# Patient Record
Sex: MALE | Race: ASIAN | NOT HISPANIC OR LATINO
[De-identification: names, ages, dates, MRNs, and addresses within clinical notes are randomized per-mention and may not be internally consistent; named-entity substitution may affect disease eponyms.]

---
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
## 2022-04-26 ENCOUNTER — OFFICE VISIT (OUTPATIENT)
Dept: FAMILY MEDICINE | Facility: CLINIC | Age: 22
End: 2022-04-26
Payer: OTHER MISCELLANEOUS

## 2022-04-26 VITALS
SYSTOLIC BLOOD PRESSURE: 138 MMHG | HEIGHT: 67 IN | BODY MASS INDEX: 22.76 KG/M2 | RESPIRATION RATE: 20 BRPM | WEIGHT: 145 LBS | OXYGEN SATURATION: 95 % | HEART RATE: 61 BPM | DIASTOLIC BLOOD PRESSURE: 73 MMHG | TEMPERATURE: 98.3 F

## 2022-04-26 DIAGNOSIS — S63.501A WRIST SPRAIN, RIGHT, INITIAL ENCOUNTER: Primary | ICD-10-CM

## 2022-04-26 PROCEDURE — 99203 OFFICE O/P NEW LOW 30 MIN: CPT | Mod: GC | Performed by: STUDENT IN AN ORGANIZED HEALTH CARE EDUCATION/TRAINING PROGRAM

## 2022-04-26 NOTE — PROGRESS NOTES
[facility] - Office Visit    ASSESSMENT & PLAN     No Mio is an otherwise healthy 21-year-old male here today for acute complaint of pain in right wrist.    Wrist sprain, right, initial encounter  Pain X 2 days.  Right-handed patient with new job requiring lifting weights and multiple fine movements including extension and external rotation of wrist.  Pain and tenderness on passive extension of wrist worse at end of workday.  Consistent with wrist sprain secondary to new physical activity.  No evidence of fracture or other acute injury on exam.  Imaging not indicated at this time.  -     WRIST SPLINT provided--patient to wear nightly and as tolerated at work  -  Letter provided for missing work today  - Advised to take time off work if possible, or wear splint while working until develops strength in wrist      Follow-up: Return in 1 week if symptoms do not improve    Patient dicussed with attending physician, Dr.Jamie Grullon, who agrees with the plan.   -----  Kari Macias MD  PGY-3  Family Medicine Resident       SUBJECTIVE       No Mio is a 21 year old  male who presents to clinic today for the following:  Chief Complaint   Patient presents with     Other     Right wrist painful from yesterday     Patient reports pain in his right wrist, worse with extension present for the past couple of days, worsened yesterday.  Patient reports that he recently started a new job at a [employer] place which requires frequent external rotation of the wrist, lifting weights without hands and wrist, multiple physical activities with a hands that he is not used to.  Reports that at the end of the day he has soreness in arms and feet worse at the end of day, improved by morning.    Patient is right-handed and had not made the connection with his job as recent toleration.  He is amenable to taking time off work if needed.  He has not tried any treatments for this.    ROS: 10 point ROS neg other than the symptoms noted above  "in the HPI.        OBJECTIVE   /73   Pulse 61   Temp 98.3  F (36.8  C) (Oral)   Resp 20   Ht 1.704 m (5' 7.1\")   Wt 65.8 kg (145 lb)   SpO2 95%   BMI 22.64 kg/m       Physical Exam  Constitutional:       Appearance: Normal appearance.   Cardiovascular:      Rate and Rhythm: Normal rate and regular rhythm.      Pulses: Normal pulses.      Heart sounds: Normal heart sounds.   Pulmonary:      Effort: Pulmonary effort is normal.      Breath sounds: Normal breath sounds.   Musculoskeletal:      Right wrist: Tenderness (pain on passive extension) present. No swelling, deformity, effusion, bony tenderness, snuff box tenderness or crepitus. Normal range of motion. Normal pulse.   Neurological:      Mental Status: He is alert.       "

## 2022-04-26 NOTE — LETTER
April 26, 2022      RE: No Mio  [address] [place] [address]       To Whom it May Concern,    No Mio  was seen in clinic today 4/26/2022.  Please excuse him from work from today.      Feel free to call us with questions or concerns.        Sincerely,  MD Kari Johns MD

## 2022-04-26 NOTE — PROGRESS NOTES
Preceptor Attestation:    I discussed the patient with the resident and evaluated the patient in person. I have verified the content of the note, which accurately reflects my assessment of the patient and the plan of care.   Supervising Physician:  Mike Green MD.

## 2024-11-04 ENCOUNTER — OFFICE VISIT (OUTPATIENT)
Dept: URGENT CARE | Facility: URGENT CARE | Age: 24
End: 2024-11-04

## 2024-11-04 VITALS
TEMPERATURE: 98.2 F | BODY MASS INDEX: 22.02 KG/M2 | SYSTOLIC BLOOD PRESSURE: 113 MMHG | RESPIRATION RATE: 14 BRPM | OXYGEN SATURATION: 98 % | WEIGHT: 141 LBS | HEART RATE: 79 BPM | DIASTOLIC BLOOD PRESSURE: 66 MMHG

## 2024-11-04 DIAGNOSIS — K21.9 GASTROESOPHAGEAL REFLUX DISEASE WITHOUT ESOPHAGITIS: Primary | ICD-10-CM

## 2024-11-04 PROCEDURE — 99213 OFFICE O/P EST LOW 20 MIN: CPT | Performed by: PHYSICIAN ASSISTANT

## 2024-11-04 RX ORDER — FAMOTIDINE 20 MG/1
20 TABLET, FILM COATED ORAL 2 TIMES DAILY
Qty: 14 TABLET | Refills: 0 | Status: SHIPPED | OUTPATIENT
Start: 2024-11-04 | End: 2024-11-11

## 2024-11-04 RX ORDER — SODIUM CITRATE 230 MG
TABLET,CHEWABLE ORAL
COMMUNITY

## 2024-11-04 NOTE — LETTER
November 4, 2024      No Mio  [address] [place] [address]        To Whom It May Concern:    No Mio  was seen  in the clinic today. Please excuse them from work/school until symptoms improve.        Sincerely,        Geovanny Huggins PA-C

## 2024-11-04 NOTE — PROGRESS NOTES
Chief Complaint   Patient presents with    Abdominal Pain     2 nights       ASSESSMENT/PLAN:  Miryam was seen today for abdominal pain.    Diagnoses and all orders for this visit:    Gastroesophageal reflux disease without esophagitis  -     famotidine (PEPCID) 20 MG tablet; Take 1 tablet (20 mg) by mouth 2 times daily for 7 days.  -     Primary Care Referral; Future    No red flag symptoms.  Will do a trial of antacids for a week or so.  Lifestyle modifications.  Follow-up with PCP if symptoms do not improve.    Geovanny Huggins PA-C      SUBJECTIVE:  Miryam is a 23 year old male who presents to urgent care with 2 days of abdominal pain.  He describes it as a burning sensation that happened sometime around 6 or 7 PM and lasts until he wakes up in the morning.  He vomited once the first night but not the second.  No constipation, nausea or vomiting since.  No diarrhea.  No recent travel or antibiotics.  No black or blood in the stool or vomit.  Able to eat and drink okay.  Feels otherwise well.  Born in [facility].  No weight loss    ROS: Pertinent ROS neg other than the symptoms noted above in the HPI.     OBJECTIVE:  /66   Pulse 79   Temp 98.2  F (36.8  C)   Resp 14   Wt 64 kg (141 lb)   SpO2 98%   BMI 22.02 kg/m     GENERAL: alert and no distress  EYES: Eyes grossly normal to inspection, PERRL and conjunctivae and sclerae normal  HENT: nose and mouth without ulcers or lesions  RESP: lungs clear to auscultation - no rales, rhonchi or wheezes  CV: regular rate and rhythm, normal S1 S2, no S3 or S4, no murmur, click or rub, no peripheral edema   ABDOMEN: soft, nontender, no hepatosplenomegaly, no masses and bowel sounds normal  SKIN: no suspicious lesions or rashes  NEURO: Normal strength and tone, mentation intact and speech normal    DIAGNOSTICS    No results found for any visits on 11/04/24.     Current Outpatient Medications   Medication Sig Dispense Refill    acetaminophen (TYLENOL) 160 MG/5ML oral liquid  Take 15 mLs by mouth every 4 hours as needed for fever. 120 mL 2    Dextrose-Fructose-Sod Citrate (NAUZENE) [phone] MG CHEW Take by mouth.      calcium carbonate (TUMS) 500 MG chewable tablet Chew two tablets by mouth as needed no more than six tablets per day (Patient not taking: Reported on 11/4/2024) 30 chew tab 0     No current facility-administered medications for this visit.      There is no problem list on file for this patient.     No past medical history on file.  No past surgical history on file.  No family history on file.  Social History     Tobacco Use    Smoking status: Never    Smokeless tobacco: Never   Substance Use Topics    Alcohol use: Not on file              The plan of care was discussed with the patient. They understand and agree with the course of treatment prescribed. A printed summary was given including instructions and medications.  The use of Dragon/Posiq dictation services may have been used to construct the content in this note; any grammatical or spelling errors are non-intentional. Please contact the author of this note directly if you are in need of any clarification.

## 2025-01-15 ENCOUNTER — OFFICE VISIT (OUTPATIENT)
Dept: FAMILY MEDICINE | Facility: CLINIC | Age: 25
End: 2025-01-15
Payer: COMMERCIAL

## 2025-01-15 VITALS
BODY MASS INDEX: 21.97 KG/M2 | SYSTOLIC BLOOD PRESSURE: 118 MMHG | HEART RATE: 99 BPM | HEIGHT: 67 IN | DIASTOLIC BLOOD PRESSURE: 60 MMHG | RESPIRATION RATE: 16 BRPM | WEIGHT: 140 LBS | OXYGEN SATURATION: 100 % | TEMPERATURE: 97.2 F

## 2025-01-15 DIAGNOSIS — Z13.220 LIPID SCREENING: ICD-10-CM

## 2025-01-15 DIAGNOSIS — Z23 NEED FOR VACCINATION: ICD-10-CM

## 2025-01-15 DIAGNOSIS — Z11.4 SCREENING FOR HIV (HUMAN IMMUNODEFICIENCY VIRUS): ICD-10-CM

## 2025-01-15 DIAGNOSIS — Z13.1 SCREENING FOR DIABETES MELLITUS: ICD-10-CM

## 2025-01-15 DIAGNOSIS — Z11.59 NEED FOR HEPATITIS C SCREENING TEST: ICD-10-CM

## 2025-01-15 DIAGNOSIS — Z28.21 COVID-19 VACCINE DOSE DECLINED: ICD-10-CM

## 2025-01-15 DIAGNOSIS — Z00.00 ROUTINE GENERAL MEDICAL EXAMINATION AT A HEALTH CARE FACILITY: ICD-10-CM

## 2025-01-15 DIAGNOSIS — L40.9 PSORIASIS: ICD-10-CM

## 2025-01-15 DIAGNOSIS — Z11.59 NEED FOR HEPATITIS B SCREENING TEST: ICD-10-CM

## 2025-01-15 LAB
EST. AVERAGE GLUCOSE BLD GHB EST-MCNC: 103 MG/DL
HBA1C MFR BLD: 5.2 % (ref 0–5.6)

## 2025-01-15 PROCEDURE — 86803 HEPATITIS C AB TEST: CPT | Performed by: FAMILY MEDICINE

## 2025-01-15 PROCEDURE — 87340 HEPATITIS B SURFACE AG IA: CPT | Performed by: FAMILY MEDICINE

## 2025-01-15 PROCEDURE — 86706 HEP B SURFACE ANTIBODY: CPT | Performed by: FAMILY MEDICINE

## 2025-01-15 PROCEDURE — 87389 HIV-1 AG W/HIV-1&-2 AB AG IA: CPT | Performed by: FAMILY MEDICINE

## 2025-01-15 PROCEDURE — 99213 OFFICE O/P EST LOW 20 MIN: CPT | Mod: 25 | Performed by: FAMILY MEDICINE

## 2025-01-15 PROCEDURE — 90715 TDAP VACCINE 7 YRS/> IM: CPT | Performed by: FAMILY MEDICINE

## 2025-01-15 PROCEDURE — 86704 HEP B CORE ANTIBODY TOTAL: CPT | Performed by: FAMILY MEDICINE

## 2025-01-15 PROCEDURE — 90471 IMMUNIZATION ADMIN: CPT | Performed by: FAMILY MEDICINE

## 2025-01-15 PROCEDURE — 36415 COLL VENOUS BLD VENIPUNCTURE: CPT | Performed by: FAMILY MEDICINE

## 2025-01-15 PROCEDURE — 83036 HEMOGLOBIN GLYCOSYLATED A1C: CPT | Performed by: FAMILY MEDICINE

## 2025-01-15 PROCEDURE — 80061 LIPID PANEL: CPT | Performed by: FAMILY MEDICINE

## 2025-01-15 PROCEDURE — 99385 PREV VISIT NEW AGE 18-39: CPT | Mod: 25 | Performed by: FAMILY MEDICINE

## 2025-01-15 RX ORDER — TRIAMCINOLONE ACETONIDE 1 MG/G
CREAM TOPICAL 2 TIMES DAILY
Qty: 30 G | Refills: 0 | Status: SHIPPED | OUTPATIENT
Start: 2025-01-15 | End: 2025-01-29

## 2025-01-15 SDOH — HEALTH STABILITY: PHYSICAL HEALTH: ON AVERAGE, HOW MANY MINUTES DO YOU ENGAGE IN EXERCISE AT THIS LEVEL?: 40 MIN

## 2025-01-15 SDOH — HEALTH STABILITY: PHYSICAL HEALTH: ON AVERAGE, HOW MANY DAYS PER WEEK DO YOU ENGAGE IN MODERATE TO STRENUOUS EXERCISE (LIKE A BRISK WALK)?: 3 DAYS

## 2025-01-15 ASSESSMENT — SOCIAL DETERMINANTS OF HEALTH (SDOH): HOW OFTEN DO YOU GET TOGETHER WITH FRIENDS OR RELATIVES?: ONCE A WEEK

## 2025-01-15 ASSESSMENT — PAIN SCALES - GENERAL: PAINLEVEL_OUTOF10: NO PAIN (0)

## 2025-01-15 NOTE — LETTER
February 12, 2025      No Mio  [address]  [place] [address]        Dear ,    We are writing to inform you of your test results.    Test results indicate you may require additional follow up, see comment below.      Your LDL (bad cholesterol) was elevated.     You can improve your cholesterol by controlling the amount and type of fat you eat and by increasing your daily activity level.     Here are some ways to improve your nutrition (adapted from the American Academy of Family Practice handout):   Eat less fat (especially butter, Crisco and other saturated fats)   Buy lean cuts of meat; reduce your portions of red meat or substitute poultry or fish   Use skim milk and low-fat dairy products   Eat no more than 4 egg yolks per week   Avoid fried or fast foods that are high in fat   Eat more fruits and vegetables     You are negative for hepatitis B, but you are not immune.  You are at risk for sameer this in the future if you are not vaccinated.  I recommend getting a 3-shot series over about six months to protect you.  Please call the clinic anytime to schedule a visit for these shots.      Your remaining labs were normal.     Please call or message me if you have questions or concerns.                   Resulted Orders   HIV Antigen Antibody Combo   Result Value Ref Range    HIV Antigen Antibody Combo Nonreactive Nonreactive      Comment:      Negative HIV-1 p24 antigen and HIV-1/2 antibody screening test results usually indicate the absence of HIV-1 and HIV-2 infection. However, such negative results do not rule-out acute HIV infection.  If acute HIV-1 or HIV-2 infection is suspected, detection of HIV-1 or HIV-2 RNA  is recommended. This result is obtained using the Roche Elecsys HIV Duo method on the sarah e801 immunoassay analyzer.   Hepatitis C Screen Reflex to HCV RNA Quant and Genotype   Result Value Ref Range    Hepatitis C Antibody Nonreactive Nonreactive      Comment:      A  nonreactive screening test result does not exclude the possibility of exposure to or infection with HCV. Nonreactive screening test results in individuals with prior exposure to HCV may be due to antibody levels below the limit of detection of this assay or lack of reactivity to the HCV antigens used in this assay. Patients with recent HCV infections (<3 months from time of exposure) may have false-negative HCV antibody results due to the time needed for seroconversion (average of 8 to 9 weeks).   Lipid panel reflex to direct LDL Non-fasting   Result Value Ref Range    Cholesterol 189 <200 mg/dL    Triglycerides 133 <150 mg/dL    Direct Measure HDL 57 >=40 mg/dL    LDL Cholesterol Calculated 105 (H) <100 mg/dL    Non HDL Cholesterol 132 (H) <130 mg/dL    Patient Fasting > 8hrs? No     Narrative    Cholesterol  Desirable: < 200 mg/dL  Borderline High: 200 - 239 mg/dL  High: >= 240 mg/dL    Triglycerides  Normal: < 150 mg/dL  Borderline High: 150 - 199 mg/dL  High: 200-499 mg/dL  Very High: >= 500 mg/dL    Direct Measure HDL  Female: >= 50 mg/dL   Male: >= 40 mg/dL    LDL Cholesterol  Desirable: < 100 mg/dL  Above Desirable: 100 - 129 mg/dL   Borderline High: 130 - 159 mg/dL   High:  160 - 189 mg/dL   Very High: >= 190 mg/dL    Non HDL Cholesterol  Desirable: < 130 mg/dL  Above Desirable: 130 - 159 mg/dL  Borderline High: 160 - 189 mg/dL  High: 190 - 219 mg/dL  Very High: >= 220 mg/dL   Hemoglobin A1c   Result Value Ref Range    Estimated Average Glucose 103 <117 mg/dL    Hemoglobin A1C 5.2 0.0 - 5.6 %      Comment:      Normal <5.7%   Prediabetes 5.7-6.4%    Diabetes 6.5% or higher     Note: Adopted from ADA consensus guidelines.   Hepatitis B core antibody   Result Value Ref Range    Hepatitis B Core Antibody Total Nonreactive Nonreactive      Comment:      Nonreactive hepatitis B core antibody test results indicate the absence of exposure to hepatitis B virus and no evidence of recent, past/resolved, or chronic  hepatitis B.    Hepatitis B Surface Antibody   Result Value Ref Range    Hepatitis B Surface Antibody Nonreactive       Comment:      Nonreactive results, defined as anti-HBs levels of less than 8.5 mIU/mL, indicate a lack of recovery from acute or chronic hepatitis B or inadequate immune response to HBV vaccination.    Hepatitis B Surface Antibody Instrument Value <3.50 <8.5 m[IU]/mL   Hepatitis B surface antigen   Result Value Ref Range    Hepatitis B Surface Antigen Nonreactive Nonreactive       If you have any questions or concerns, please call the clinic at the number listed above.       Sincerely,      Nacho De Los Santos MD/estuardo    Electronically signed

## 2025-01-15 NOTE — NURSING NOTE
Prior to immunization administration, verified patients identity using patient s name and date of birth. Please see Immunization Activity for additional information.     Screening Questionnaire for Adult Immunization    Are you sick today?   No   Do you have allergies to medications, food, a vaccine component or latex?   No   Have you ever had a serious reaction after receiving a vaccination?   No   Do you have a long-term health problem with heart, lung, kidney, or metabolic disease (e.g., diabetes), asthma, a blood disorder, no spleen, complement component deficiency, a cochlear implant, or a spinal fluid leak?  Are you on long-term aspirin therapy?   No   Do you have cancer, leukemia, HIV/AIDS, or any other immune system problem?   No   Do you have a parent, brother, or sister with an immune system problem?   No   In the past 3 months, have you taken medications that affect  your immune system, such as prednisone, other steroids, or anticancer drugs; drugs for the treatment of rheumatoid arthritis, Crohn s disease, or psoriasis; or have you had radiation treatments?   No   Have you had a seizure, or a brain or other nervous system problem?   No   During the past year, have you received a transfusion of blood or blood    products, or been given immune (gamma) globulin or antiviral drug?   No   For women: Are you pregnant or is there a chance you could become       pregnant during the next month?   No   Have you received any vaccinations in the past 4 weeks?   No     Immunization questionnaire answers were all negative.      Patient instructed to remain in clinic for 15 minutes afterwards, and to report any adverse reactions.     Screening performed by Sharron Hough MA on 1/15/2025 at 3:30 PM.

## 2025-01-15 NOTE — PATIENT INSTRUCTIONS
Over the counter skin products -   Cervae healing ointment     You can use steroid cream twice daily, small amount to affected area for 2 weeks. Follow up with dermatologist.     Patient Education   Preventive Care Advice   This is general advice given by our system to help you stay healthy. However, your care team may have specific advice just for you. Please talk to your care team about your preventive care needs.  Nutrition  Eat 5 or more servings of fruits and vegetables each day.  Try wheat bread, brown rice and whole grain pasta (instead of white bread, rice, and pasta).  Get enough calcium and vitamin D. Check the label on foods and aim for 100% of the RDA (recommended daily allowance).  Lifestyle  Exercise at least 150 minutes each week  (30 minutes a day, 5 days a week).  Do muscle strengthening activities 2 days a week. These help control your weight and prevent disease.  No smoking.  Wear sunscreen to prevent skin cancer.  Have a dental exam and cleaning every 6 months.  Yearly exams  See your health care team every year to talk about:  Any changes in your health.  Any medicines your care team has prescribed.  Preventive care, family planning, and ways to prevent chronic diseases.  Shots (vaccines)   HPV shots (up to age 26), if you've never had them before.  Hepatitis B shots (up to age 59), if you've never had them before.  COVID-19 shot: Get this shot when it's due.  Flu shot: Get a flu shot every year.  Tetanus shot: Get a tetanus shot every 10 years.  Pneumococcal, hepatitis A, and RSV shots: Ask your care team if you need these based on your risk.  Shingles shot (for age 50 and up)  General health tests  Diabetes screening:  Starting at age 35, Get screened for diabetes at least every 3 years.  If you are younger than age 35, ask your care team if you should be screened for diabetes.  Cholesterol test: At age 39, start having a cholesterol test every 5 years, or more often if advised.  Bone density  scan (DEXA): At age 50, ask your care team if you should have this scan for osteoporosis (brittle bones).  Hepatitis C: Get tested at least once in your life.  STIs (sexually transmitted infections)  Before age 24: Ask your care team if you should be screened for STIs.  After age 24: Get screened for STIs if you're at risk. You are at risk for STIs (including HIV) if:  You are sexually active with more than one person.  You don't use condoms every time.  You or a partner was diagnosed with a sexually transmitted infection.  If you are at risk for HIV, ask about PrEP medicine to prevent HIV.  Get tested for HIV at least once in your life, whether you are at risk for HIV or not.  Cancer screening tests  Cervical cancer screening: If you have a cervix, begin getting regular cervical cancer screening tests starting at age 21.  Breast cancer scan (mammogram): If you've ever had breasts, begin having regular mammograms starting at age 40. This is a scan to check for breast cancer.  Colon cancer screening: It is important to start screening for colon cancer at age 45.  Have a colonoscopy test every 10 years (or more often if you're at risk) Or, ask your provider about stool tests like a FIT test every year or Cologuard test every 3 years.  To learn more about your testing options, visit:   .  For help making a decision, visit:   https://bit.ly/cp98664.  Prostate cancer screening test: If you have a prostate, ask your care team if a prostate cancer screening test (PSA) at age 55 is right for you.  Lung cancer screening: If you are a current or former smoker ages 50 to 80, ask your care team if ongoing lung cancer screenings are right for you.  For informational purposes only. Not to replace the advice of your health care provider. Copyright   2023 [employer]. All rights reserved. Clinically reviewed by the [facility] Transitions Program. Dezide 925085 - REV 01/24.

## 2025-01-15 NOTE — PROGRESS NOTES
Preventive Care Visit  [facility]  Nacho De Los Santos MD, Family Medicine  Gage 15, 2025  {Provider  Link to SmartSet :473574}    {PROVIDER CHARTING PREFERENCE:166266}    Subjective   No is a 24 year old, presenting for the following:  Physical        1/15/2025     1:23 PM   Additional Questions   Roomed by Belle Dozier   Accompanied by Pollack - Spouse          HPI    Concerns - dry spots which happen around his body which occur in his upper arms. He has never had it before this winter. It also gets itchy. He uses vanicream once daily. No ear pain.   Ear canal is also extremely dry and flaking.   His balls are extremely dry. He is staying hydrated. No testicular pain or trauma.   He lives at home. He just signed up for college class.   Born in [facility]. Moved to [facility] at age 7. Lived in [place] between ages [age].   Health Care Directive  Patient does not have a Health Care Directive: {ADVANCE_DIRECTIVE_STATUS:258638}      1/15/2025   General Health   How would you rate your overall physical health? Good   Feel stress (tense, anxious, or unable to sleep) Only a little   (!) STRESS CONCERN      1/15/2025   Nutrition   Three or more servings of calcium each day? Yes   Diet: I don't know   How many servings of fruit and vegetables per day? (!) 2-3   How many sweetened beverages each day? 0-1         1/15/2025   Exercise   Days per week of moderate/strenous exercise 3 days   Average minutes spent exercising at this level 40 min         1/15/2025   Social Factors   Frequency of gathering with friends or relatives Once a week   Worry food won't last until get money to buy more Patient declined   Food not last or not have enough money for food? No   Do you have housing? (Housing is defined as stable permanent housing and does not include staying ouside in a car, in a tent, in an abandoned building, in an overnight shelter, or couch-surfing.) Yes   Are you worried about losing your housing? No   Lack  "of transportation? No   Unable to get utilities (heat,electricity)? No         1/15/2025   Dental   Dentist two times every year? (!) NO         1/15/2025   TB Screening   Were you born outside of the [place]? Yes         Today's PHQ-2 Score:       1/15/2025     1:18 PM   PHQ-2 ( 1999 Pf[employer])   Q1: Little interest or pleasure in doing things 1   Q2: Feeling down, depressed or hopeless 0   PHQ-2 Score 1    Q1: Little interest or pleasure in doing things Several days   Q2: Feeling down, depressed or hopeless Not at all   PHQ-2 Score 1       Patient-reported           1/15/2025   Substance Use   Alcohol more than 3/day or more than 7/wk No   Do you use any other substances recreationally? No     Social History     Tobacco Use    Smoking status: Never    Smokeless tobacco: Never     {Provider  If there are gaps in the social history shown above, please follow the link to update and then refresh the note Link to Social and Substance History :930372}        1/15/2025   One time HIV Screening   Previous HIV test? No         1/15/2025   STI Screening   New sexual partner(s) since last STI/HIV test? No         1/15/2025   Contraception/Family Planning   Questions about contraception or family planning (!) YES ***     {Provider  REQUIRED FOR AWV Use the storyboard to review patient history, after sections have been marked as reviewed, refresh note to capture documentation:675863}   Reviewed and updated as needed this visit by Provider                    {HISTORY OPTIONS (Optional):150842}    {ROS Picklists (Optional):461920}     Objective    Exam  BP (!) 147/77 (BP Location: Right arm, Patient Position: Sitting, Cuff Size: Adult Regular)   Pulse 99   Temp 97.2  F (36.2  C) (Temporal)   Resp 16   Ht 1.702 m (5' 7\")   Wt 63.5 kg (140 lb)   SpO2 100%   BMI 21.93 kg/m     Estimated body mass index is 21.93 kg/m  as calculated from the following:    Height as of this encounter: 1.702 m (5' 7\").    Weight as of this encounter: " 63.5 kg (140 lb).    Physical Exam  {Exam Choices (Optional):304010}        Signed Electronically by: Nacho De Los Santos MD  {Email feedback regarding this note to [email or web address]   :329808}

## 2025-01-16 LAB
CHOLEST SERPL-MCNC: 189 MG/DL
FASTING STATUS PATIENT QL REPORTED: NO
HBV CORE AB SERPL QL IA: NONREACTIVE
HBV SURFACE AB SERPL IA-ACNC: <3.5 M[IU]/ML
HBV SURFACE AB SERPL IA-ACNC: NONREACTIVE M[IU]/ML
HBV SURFACE AG SERPL QL IA: NONREACTIVE
HCV AB SERPL QL IA: NONREACTIVE
HDLC SERPL-MCNC: 57 MG/DL
HIV 1+2 AB+HIV1 P24 AG SERPL QL IA: NONREACTIVE
LDLC SERPL CALC-MCNC: 105 MG/DL
NONHDLC SERPL-MCNC: 132 MG/DL
TRIGL SERPL-MCNC: 133 MG/DL

## 2025-02-12 NOTE — RESULT ENCOUNTER NOTE
Please send the letter to the patient with the following.       Your LDL (bad cholesterol) was elevated.     You can improve your cholesterol by controlling the amount and type of fat you eat and by increasing your daily activity level.    Here are some ways to improve your nutrition (adapted from the American Academy of Family Practice handout):  Eat less fat (especially butter, Crisco and other saturated fats)  Buy lean cuts of meat; reduce your portions of red meat or substitute poultry or fish  Use skim milk and low-fat dairy products  Eat no more than 4 egg yolks per week  Avoid fried or fast foods that are high in fat  Eat more fruits and vegetables    You are negative for hepatitis B, but you are not immune.  You are at risk for sameer this in the future if you are not vaccinated.  I recommend getting a 3-shot series over about six months to protect you.  Please call the clinic anytime to schedule a visit for these shots.      Your remaining labs were normal.     Please call or message me if you have questions or concerns.

## 2025-04-22 ENCOUNTER — ALLIED HEALTH/NURSE VISIT (OUTPATIENT)
Dept: FAMILY MEDICINE | Facility: CLINIC | Age: 25
End: 2025-04-22
Payer: COMMERCIAL

## 2025-04-22 VITALS — TEMPERATURE: 98.2 F

## 2025-04-22 DIAGNOSIS — Z23 IMMUNIZATION DUE: Primary | ICD-10-CM

## 2025-04-22 DIAGNOSIS — Z23 ENCOUNTER FOR IMMUNIZATION: ICD-10-CM

## 2025-04-22 PROCEDURE — 90471 IMMUNIZATION ADMIN: CPT

## 2025-04-22 PROCEDURE — 90716 VAR VACCINE LIVE SUBQ: CPT

## 2025-04-22 PROCEDURE — 90472 IMMUNIZATION ADMIN EACH ADD: CPT

## 2025-04-22 PROCEDURE — 99207 PR NO CHARGE NURSE ONLY: CPT

## 2025-04-22 PROCEDURE — 90707 MMR VACCINE SC: CPT

## 2025-04-22 NOTE — PROGRESS NOTES
Prior to immunization administration, verified patients identity using patient s name and date of birth. Please see Immunization Activity for additional information.     Is the patient's temperature normal (100.5 or less)? Yes     Patient MEETS CRITERIA. PROCEED with vaccine administration.      Patient instructed to remain in clinic for 15 minutes afterwards, and to report any adverse reactions.      Link to Ancillary Visit Immunization Standing Orders SmartSet     Screening performed by Nany Carlson MA on 4/22/2025 at 3:15 PM.